# Patient Record
Sex: FEMALE | Race: BLACK OR AFRICAN AMERICAN | Employment: FULL TIME | URBAN - METROPOLITAN AREA
[De-identification: names, ages, dates, MRNs, and addresses within clinical notes are randomized per-mention and may not be internally consistent; named-entity substitution may affect disease eponyms.]

---

## 2018-08-27 ENCOUNTER — APPOINTMENT (OUTPATIENT)
Dept: PHYSICAL THERAPY | Age: 33
End: 2018-08-27
Attending: RADIOLOGY

## 2018-10-01 ENCOUNTER — HOSPITAL ENCOUNTER (OUTPATIENT)
Dept: PHYSICAL THERAPY | Age: 33
Discharge: HOME | End: 2018-10-01
Attending: RADIOLOGY | Admitting: RADIOLOGY
Payer: COMMERCIAL

## 2018-10-01 PROCEDURE — 97530 THERAPEUTIC ACTIVITIES: CPT

## 2018-10-01 PROCEDURE — 97162 PT EVAL MOD COMPLEX 30 MIN: CPT

## 2018-10-01 PROCEDURE — 97140 MANUAL THERAPY 1/> REGIONS: CPT

## 2018-10-01 NOTE — PROGRESS NOTES
PF Daily Treatment Note  Patient Name: Lisa Pozo  Date:10/1/2018  []  Patient  Verified  Insurance:Payor: BLUE CROSS MEDICAID / Plan: [employer] / Product Type: Managed Care Medicaid /   In time: 10:05  Out time:10:40  Total Treatment Time (min): 45  Total Timed Codes (min): 25  1:1 Treatment Time (MC only): 39   Visit #: 1 of 16    Treatment Area: [x] Pelvic Floor     [] Other:  SUBJECTIVE  Pain Level (0-10 scale): 1-2/10  Any medication changes, allergies to medications, adverse drug reactions, diagnosis change, or new procedure performed?: [x] No    [] Yes (see summary sheet for update)    Ms. Sandra Hylton is a 25 y/o F who presents with c/o PFD, abdominal and back pain, 2nd to endometriosis. Pt reports 2 LAP, fulguration of endometriosis, last one was on 2018. Pt reports 1 natural child birth with no significant complications recalled. Pt reported persistent and worsening pain after changing contraceptive med as she's trying to have another baby. Pt also reports mild leakage symptoms and pain with holding against strong urge. Voiding interval is about 3 hours. Nocturia 1-2/night. Pt denies bowel incontinence or straining to have successful bowel movements. Pt reports pain which occurs with mod pain with sexual relations; occasionally when she just starts to feel aroused; pt also reports using pain med before any activities. Pain is rated 2-3/3 on Marinoff scale. Pelvic Floor Dysfunction Evaluation    Musculoskeletal Screen:    Skin Integrity:  [] Healthy [] Red  [] Labia Atrophy [] Fragile    Sensation: [] Intact [] Diminished:    Muscle Bulk: [] Symmetrical  [] Well-developed [] Atrophied:  []L   []R   []B    Prolapse: [] Cystocele:   [] Rectocele:    PERF Score (Performance/Endurance/Repetitions/Flicks)   P: E: R: F: Total:    Patient has failed previous pelvic floor muscle training?   [] Yes    [] No    EMG Evaluation:  [] N/A [] Deferred secondary to:    Channel A: Electrode type:  [] Internal    [] Surface    [] Vaginal    [] Rectal  Channel B: Electrode location:    Baseline Resting Tone (1 minute)  Channel A (microvolts): Quality:  [] Normal [] Irradic [] Elevated  Channel B (microvolts): Slow Twitch: (10 second hold, 20 second rest)  Channel A (microvolts): Quality:[] Quick/slow rise [] Low net rise  Net rise (microvolts):   [] Slow Relaxation [] Incoordination       [] Unable to contract [] Fatigues at (sec):       [] Elevated baseline between contractions    Channel B (microvolts): Use of Accessory Muscles: [] Minimal  Net rise (microvolts):   [] Moderate  [] Excessive       [] No use of accessory muscles    Fast Twitch (3 second hold, 10 second rest)  Channel A (microvolts): Quality:[] Quick/slow rise [] Low net rise  Net rise (microvolts):   [] Slow Relaxation [] Incoordination       [] Unable to contract [] Fatigues at (sec):       [] Elevated baseline between contractions    Channel B (microvolts):  Use of Accessory Muscles: [] Minimal  Net rise (microvolts):   [] Moderate  [] Excessive       [] No use of accessory muscles    Optional Tests:  Lower abdominal strength: /5    Comments/Additional Tests:    WFL with trunk AROM  Hypomobility of Left SI  Right upslip and ant rotation    OBJECTIVE    20 min Evaluation    17 min Therapeutic Activity:  []  See flow sheet :Pt edu within scope of practice on prognosis, POC, PFM anatomy/physiology, PFM PT, self MET for pelvic obliquity correction, TENS unit use   Rationale: increase ROM, increase strength, improve coordination and increase proprioception  to improve the patients ability to tolerate ADLs     8 min Manual Therapy:  MET to correct Right upslip and ant rot, followed by shotgun   Rationale: decrease pain, increase ROM, increase tissue extensibility, decrease trigger points and increase postural awareness to improve tolerance for ADLs           min Patient Education: [x] Review HEP    [] Progressed/Changed HEP based on:   [] positioning   [] body mechanics   [] transfers   [] heat/ice application        Other Objective/Functional Measures:   []baseline resting tone:   []slow twitch mms   []fast twitch mms    Pain Level (0-10 scale) post treatment: 0-1/10    ASSESSMENT/Changes in Function: see POC please    []  Decrease # of leaks   [] No change []  Improving [] Resolved     []  Decrease hypertonus [] No change []  Improving [] Resolved     []  Increase void interval [] No change []  Improving [] Resolved     []  Increase PF strength [] No change []  Improving [] Resolved     []  Increase PF endurance [] No change []  Improving [] Resolved     []  Increase endurance [] No change []  Improving [] Resolved     []  Decrease # of pads [] No change []  Improving [] Resolved     []  Decrease pain [] No change []  Improving [] Resolved       Patient will continue to benefit from skilled PT services to modify and progress therapeutic interventions, address functional mobility deficits, address ROM deficits, address strength deficits, analyze and address soft tissue restrictions, analyze and cue movement patterns, analyze and modify body mechanics/ergonomics, assess and modify postural abnormalities and instruct in home and community integration to attain remaining goals.      [x]  See Plan of Care         PLAN  [x]  Upgrade activities as tolerated     [x]  Continue plan of care  [x]  Update interventions per flow sheet       []  Discharge due to:_  []  Other:_      Lusi Wu, PT 10/1/2018  10:09 AM

## 2018-10-01 NOTE — PROGRESS NOTES
[facility]  [address] [place] [address]  [phone] fax    Plan of Care/ Statement of Necessity for Physical Therapy Services    Patient name: Shalini Chapa Start of Care: 10/1/2018   Referral source: Sasha Noe* : 1985    Medical Diagnosis: Noninflammatory disorder of vagina, unspecified [N89.9]  Dysuria [R30.0]   Onset Date: chronic, worsening about 4 years ago    Treatment Diagnosis: PFD, Endometriosis   Prior Hospitalization: see medical history Provider#: [id number]   Medications: Verified on Patient summary List    Comorbidities:  Endometriosis, 2 LAP fulguration of endometriosis   Prior Level of Function: less pain, Ind with all mobility           The Plan of Care and following information is based on the information from the initial evaluation. Assessment/ espinal information: Ms. Silvio Joy is a 25 y/o F who presents with c/o PFD, abdominal and back pain, 2nd to endometriosis. Pt reports 2 LAP, fulguration of endometriosis, last one was on 2018. Pt reports 1 natural child birth with no significant complications recalled. Pt reported persistent and worsening pain after changing contraceptive med as she's trying to have another baby. Pt also reports mild leakage symptoms and pain with holding against strong urge. Voiding interval is about 3 hours. Nocturia 1-2/night. Pt denies bowel incontinence or straining to have successful bowel movements. Pt reports pain which occurs with mod pain with sexual relations; occasionally when she just starts to feel aroused; pt also reports using pain med before any activities. Pain is rated 2-3/3 on Marinoff scale.  Evaluation reveals patient with musculoskeletal screen hypomobility of Left SI joint, Right upslip and ant rotation.  Internal assessment held until next visit due to time constraint.  Patient may benefit from physical therapy to address PFD, LBP and UI symptoms to improve her QOL. Evaluation Complexity History MEDIUM  Complexity : 1-2 comorbidities / personal factors will impact the outcome/ POC ; Examination MEDIUM Complexity : 3 Standardized tests and measures addressing body structure, function, activity limitation and / or participation in recreation  ;Presentation MEDIUM Complexity : Evolving with changing characteristics  ; Clinical Decision Making MEDIUM Complexity : FOTO score of 26-74  Overall Complexity Rating: MEDIUM    Problem List: Pelvic pain/dysfunction, Decreased pelvic floor mm awareness, Decreased pelvic floor mm strength, Use of accessory muscles, Improper voiding habits and Hypertonus of pelvic floor    Treatment Plan may include any combination of the following:   Therapeutic exercise, Neuromuscular re-education, Manual therapy, Physical agent/modality and Patient education     Patient / Family readiness to learn indicated by: asking questions, trying to perform skills and interest    Persons(s) to be included in education: patient (P)    Barriers to Learning/Limitations: None    Patient Goal (s): reduce pain, learn exercises    Patient Self Reported Health Status: good    Rehabilitation Potential: good    Short Term Goals: To be accomplished in 4  weeks:  1. Patient will demonstrate home exercise program accurately as adjunct to PT clinic visits to promote healthy lifestyle and increase quality of life. Status @ Eval: initiate at 2nd viist    2. Patient will report ability to utilize Dilator progressively with no pain to promote decrease of symptoms and increase quality of life. Status @ Eval: provide as indicated      3. Patient will report pain decreased to 1-2/3 on Marinoff scale for more normal function and increased quality of life. Status @ Eval: 2-3/3      Long Term Goals: To be accomplished in 8  weeks:  1.  Patient will have FOTO score for PFDI Pain decreased by 5-8% points indicating improvement in function and report of no difficulty with maintaining intimate relations due to pain. Status @ Eval: 38    2. Patient will be able to participate in sexual relations with no discomfort with 0-1/3 Marinoff score for increased quality of life and more normal function. Status @ Eval: 2-3/3     3. Patient will have decreased overall pain to 2-3/10 at most with ADLs, job duties and working out to improve her QOL. Status @ Eval: 8/10 at worst    4. Patient will report at least 75% improvement with leakage to improve her QOL. Status @ Eval: mild symptoms, mostly during strong urge    5. Patient will be independent with HEP at time of discharge to be able to continue with pelvic floor program.  Status @ Eval: initiate at 2nd visit     Frequency / Duration: Patient to be seen 1-2 times per week for 8 weeks. Patient/ Caregiver education and instruction: Diagnosis, prognosis, Proper Voiding Habits, Diet, Pain Management, Exercises and Bladder Retraining      Jenny Singletary, PT 10/1/2018 11:11 AM    ________________________________________________________________________    I certify that the above Therapy Services are being furnished while the patient is under my care. I agree with the treatment plan and certify that this therapy is necessary.     Physician's Signature:____________Date:_________TIME:________    ** Signature, Date and Time must be completed for valid certification **      Please sign and return to [facility]   [address] [place] [address]  [phone] fax

## 2018-10-08 ENCOUNTER — HOSPITAL ENCOUNTER (OUTPATIENT)
Dept: PHYSICAL THERAPY | Age: 33
Discharge: HOME | End: 2018-10-08
Attending: RADIOLOGY | Admitting: RADIOLOGY
Payer: COMMERCIAL

## 2018-10-08 PROCEDURE — 97530 THERAPEUTIC ACTIVITIES: CPT

## 2018-10-08 PROCEDURE — 97140 MANUAL THERAPY 1/> REGIONS: CPT

## 2018-10-08 NOTE — PROGRESS NOTES
PF DAILY TREATMENT NOTE 3-16    Patient Name: Karin Venegas  Date:10/8/2018  : 1985  [x]  Patient  Verified  Payor: BLUE CROSS / Plan: [facility] PPO / Product Type: PPO /    In time: 4:40  Out time: 5:15  Total Treatment Time (min): 35  Visit #: 2 of 16    Medicare/BCBS Only   Total Timed Codes (min):  35 1:1 Treatment Time:  35     Treatment Area: [x] Pelvic Floor     [] Other:    SUBJECTIVE  Pain Level (0-10 scale): 0/10  Any medication changes, allergies to medications, adverse drug reactions, diagnosis change, or new procedure performed?: [x] No    [] Yes (see summary sheet for update)  Subjective functional status/changes:   [] No changes reported  Pt reports taking pain med as she had significant pain about 2 ago     OBJECTIVE      27 min Therapeutic Activity:  []  See flow sheet :    []  Increase Tissue extensibility        []  Assess fiber intake    []  Assess voiding habits  []  Assess bowel habits  []  Other:   Rationale: increase ROM, increase strength, improve coordination and increase proprioception  to improve the patients ability to improve tolerance for ADLs      8 min Manual Therapy:  Intravaginal MFR   Rationale: decrease pain, increase ROM and decrease trigger points to improve pt's tolerance for ADLs/amb    With   [] TE   [] TA   [] neuro  [] manual   [] other: Patient Education: [x] Review HEP    [] Progressed/Changed HEP based on:   [] positioning   [] body mechanics   [] transfers   [] heat/ice application    [] other:      Other Objective/Functional Measures:   []baseline resting tone:   []slow twitch mms   []fast twitch mms   Pt was 10 min late  Skin Integrity:            [x] Healthy                     [] Red                                     [] Labia Atrophy           [] Fragile     Sensation:                 [x] Intact            [] Diminished:     Muscle Bulk:              [] Symmetrical  [] Well-developed                     [] Atrophied:  []L   []R []B     Prolapse: no instability notice                   [] Cystocele:                                                            [] Rectocele:     PERF Score (Performance/Endurance/Repetitions/Flicks)                        P: 3                   E: 5                   R: 5                  F:                   Total:     Patient has failed previous pelvic floor muscle training? [] Yes    [x] No    Pain Level (0-10 scale) post treatment: 1/10    ASSESSMENT/Changes in Function: pt reported some improvement with self correction therex but then she has flare up again. Pt demonstrates mod hypertonicity with deep hips muscles and B levator ani. However, she also demonstrates fairly good strength and endurance of PFM, good awareness and understanding of downtraining therex. Will progress with MFR for abdominal, stretchings and TENS unit use next visit.     []  Decrease # of leaks   [] No change []  Improving [] Resolved     []  Decrease hypertonus [] No change []  Improving [] Resolved     []  Increase void interval [] No change []  Improving [] Resolved     []  Increase PF strength [] No change []  Improving [] Resolved     []  Increase PF endurance [] No change []  Improving [] Resolved     []  Increase endurance [] No change []  Improving [] Resolved     []  Decrease # of pads [] No change []  Improving [] Resolved     []  Decrease pain [] No change []  Improving [] Resolved     []  Increased coordination [] No change []  Improving [] Resolved     []  Increased Bowel Frequency [] No change []  Improving [] Resolved       Patient will continue to benefit from skilled PT services to modify and progress therapeutic interventions, address functional mobility deficits, address ROM deficits, address strength deficits, analyze and address soft tissue restrictions, analyze and cue movement patterns, analyze and modify body mechanics/ergonomics, assess and modify postural abnormalities and instruct in home and community integration to attain remaining goals. [x]  See Plan of Care  []  See progress note/recertification  []  See Discharge Summary         Progress towards goals / Updated goals:  Short Term Goals: To be accomplished in 4  weeks:  1. Patient will demonstrate home exercise program accurately as adjunct to PT clinic visits to promote healthy lifestyle and increase quality of life. Status @ Eval: initiate at 2nd viist   Current: mod flare up with self correction therex for pelvic alignment, will reassess next visit 10-8-18     2. Patient will report ability to utilize Dilator progressively with no pain to promote decrease of symptoms and increase quality of life. Status @ Eval: provide as indicated       3. Patient will report pain decreased to 1-2/3 on Marinoff scale for more normal function and increased quality of life. Status @ Eval: 2-3/3       Long Term Goals: To be accomplished in 8  weeks:  1. Patient will have FOTO score for PFDI Pain decreased by 5-8% points indicating improvement in function and report of no difficulty with maintaining intimate relations due to pain. Status @ Eval: 38     2. Patient will be able to participate in sexual relations with no discomfort with 0-1/3 Marinoff score for increased quality of life and more normal function. Status @ Eval: 2-3/3      3. Patient will have decreased overall pain to 2-3/10 at most with ADLs, job duties and working out to improve her QOL. Status @ Eval: 8/10 at worst     4. Patient will report at least 75% improvement with leakage to improve her QOL. Status @ Eval: mild symptoms, mostly during strong urge   Current: no leakage since last visit 10-8-18     5.  Patient will be independent with HEP at time of discharge to be able to continue with pelvic floor program.  Status @ Eval: initiate at 2nd visit     PLAN  [x]  Upgrade activities as tolerated     [x]  Continue plan of care  [x]  Update interventions per flow sheet       []  Discharge due to:_  []  Other:_      Audrey Ramp, PT 10/8/2018  7:54 AM    Future Appointments  Date Time Provider Laurent Ceci   10/8/2018 4:30 PM Thienphuc Lacinda Drought [id number] [facility]   10/15/2018 3:15 PM Thienphuc Bryceinda Drought RSROPCC [facility]   10/22/2018 3:45 PM Thijosephuc JERROD Le MMCPTPB [facility]   10/29/2018 2:00 PM Thipako Long [id number] [facility]   11/5/2018 2:00 PM Thijosephlinsey Ahujajesse Mercedes KGTOVQS [facility]   11/12/2018 2:00 PM Thipako Long GDGGSHT [facility]   11/19/2018 3:15 PM Thijosephlinsey Ahujajesse Merceeds [id number] [facility]   11/26/2018 2:00 PM Kirk Meier Le MMCPTPB [facility]

## 2018-10-15 ENCOUNTER — HOSPITAL ENCOUNTER (OUTPATIENT)
Dept: PHYSICAL THERAPY | Age: 33
Discharge: HOME | End: 2018-10-15
Attending: RADIOLOGY | Admitting: RADIOLOGY
Payer: COMMERCIAL

## 2018-10-15 PROCEDURE — 97110 THERAPEUTIC EXERCISES: CPT

## 2018-10-15 PROCEDURE — 97140 MANUAL THERAPY 1/> REGIONS: CPT

## 2018-10-15 PROCEDURE — 97530 THERAPEUTIC ACTIVITIES: CPT

## 2018-10-15 NOTE — PROGRESS NOTES
PF DAILY TREATMENT NOTE 3-16    Patient Name: Lisa Pozo  Date:10/15/2018  : 1985  [x]  Patient  Verified  Payor: BLUE CROSS / Plan: [facility] PPO / Product Type: PPO /    In time: 3:21  Out time:4:25  Total Treatment Time (min): 64  Visit #: 3 of 16    Medicare/BCBS Only   Total Timed Codes (min):  64 1:1 Treatment Time:  64     Treatment Area: [x] Pelvic Floor     [] Other:    SUBJECTIVE  Pain Level (0-10 scale): 4-5/10  Any medication changes, allergies to medications, adverse drug reactions, diagnosis change, or new procedure performed?: [x] No    [] Yes (see summary sheet for update)  Subjective functional status/changes:   [] No changes reported  Pt reported having some pain after PFM down training therex but she forgot to decreased duration/frequency as instructed. Pt reports holding her baby on 1 side for 2 years.     OBJECTIVE    Modality rationale: decrease pain, increase tissue extensibility and increase muscle contraction/control to improve the patients ability to tolerate ADLs   Min Type Additional Details   10 during manual [x] Estim:  []Unatt       []IFC  []Premod                        [x]Other: Burst/HV  []w/ice   [x]w/heat  Position: long sitting  Location: Right QL    [] Estim: []Att    []TENS instruct  []NMES                    []Other:  []w/US   []w/ice   []w/heat  Position:  Location:    []  Ultrasound: []Continuous   [] Pulsed                           []1MHz   []3MHz Position:  Location:    []  Ice     []  heat  []  Ice massage  []  Laser   []  Anodyne Position:  Location:   [] Skin assessment post-treatment:  []intact []redness- no adverse reaction    []redness - adverse reaction:         10 min Therapeutic Exercise:  [x] See flow sheet :   []  Pelvic floor strengthening                 []  Pelvic floor downtraining  []  Quality pelvic floor contractions       []  Relaxation techniques  []  Urge suppression exercises  []  Other:  Rationale: increase ROM, increase strength, improve coordination, improve balance and increase proprioception  to improve the patients ability to tolerate ADLs     15 min Therapeutic Activity:  []  See flow sheet :    []  Increase Tissue extensibility        []  Assess fiber intake    [x]  Assess voiding habits                                          []  Assess bowel habits  [x]  Other:  Modification of PFM therex, education on TENS unit use                       Rationale: increase ROM, increase strength, improve coordination and increase proprioception  to improve the patients ability to improve tolerance for ADLs     39 min Manual Therapy:  MET/myofascial pull to correct Right Up slip, AI or Left PI, followed by shot gun, DTM/TPR for B QLs, focussed on Right side, abdominal MFR   Rationale: decrease pain, increase ROM, increase tissue extensibility and decrease trigger points to improve pt's tolerance for ADLs     min Bladder Training :   [] voiding schedule          [] program progression  [] decrease every  minutes/hours           With   [x] TE   [] TA   [] neuro  [] manual   [] other: Patient Education: [x] Review HEP    [] Progressed/Changed HEP based on:   [] positioning   [] body mechanics   [] transfers   [] heat/ice application    [] other:      Other Objective/Functional Measures:   []baseline resting tone:   []slow twitch mms   []fast twitch mms    Pain Level (0-10 scale) post treatment: 0-1/10    ASSESSMENT/Changes in Function: pt demonstrated good motivation/compliance with all education/HEP. Pt cont to present with mal-alignment of pelvic; pt recalls that she's been carrying her son on Right side only. Will cont with pelvic alignment assessment and internal manual next visit.      []  Decrease # of leaks [] No change []  Improving [] Resolved   []  Decrease hypertonus [] No change []  Improving [] Resolved   []  Increase void interval [] No change []  Improving [] Resolved   []  Increase PF strength [] No change [] Improving [] Resolved   []  Increase PF endurance [] No change []  Improving [] Resolved   []  Increase endurance [] No change []  Improving [] Resolved   []  Decrease # of pads [] No change []  Improving [] Resolved   []  Decrease pain [] No change []  Improving [] Resolved   []  Increased coordination [] No change []  Improving [] Resolved   []  Increased Bowel Frequency [] No change []  Improving [] Resolved      Patient will continue to benefit from skilled PT services to modify and progress therapeutic interventions, address functional mobility deficits, address ROM deficits, address strength deficits, analyze and address soft tissue restrictions, analyze and cue movement patterns, analyze and modify body mechanics/ergonomics, assess and modify postural abnormalities and instruct in home and community integration to attain remaining goals.      [x]  See Plan of Care  []  See progress note/recertification  []  See Discharge Summary      Progress towards goals / Updated goals:  Short Term Goals: To be accomplished in 4  weeks:  1. Patient will demonstrate home exercise program accurately as adjunct to PT clinic visits to promote healthy lifestyle and increase quality of life. Status @ Eval: initiate at 2nd viist   Current: mod flare up with self correction therex for pelvic alignment, will reassess next visit 10-8-18      2. Patient will report ability to utilize Dilator progressively with no pain to promote decrease of symptoms and increase quality of life. Status @ Eval: provide as indicated        3. Patient will report pain decreased to 1-2/3 on Marinoff scale for more normal function and increased quality of life. Status @ Eval: 2-3/3       Long Term Goals: To be accomplished in 8  weeks:  1. Patient will have FOTO score for PFDI Pain decreased by 5-8% points indicating improvement in function and report of no difficulty with maintaining intimate relations due to pain. Status @ Eval: 38      2.  Patient will be able to participate in sexual relations with no discomfort with 0-1/3 Marinoff score for increased quality of life and more normal function. Status @ Eval: 2-3/3       3. Patient will have decreased overall pain to 2-3/10 at most with ADLs, job duties and working out to improve her QOL. Status @ Eval: 8/10 at worst      4. Patient will report at least 75% improvement with leakage to improve her QOL.   Status @ Eval: mild symptoms, mostly during strong urge   Current: no leakage since last visit 10-8-18      5. Patient will be independent with HEP at time of discharge to be able to continue with pelvic floor program.  Status @ Eval: initiate at 2nd visit      PLAN  [x]  Upgrade activities as tolerated     [x]  Continue plan of care  [x]  Update interventions per flow sheet       []  Discharge due to:_  []  Other:_      Jenny Counter, PT 10/15/2018  7:57 AM    Future Appointments  Date Time Provider Laurent Ornelas   10/15/2018 3:15 PM Thienphuc Yvan Crissy MMCPTPB [facility]   10/22/2018 3:45 PM Jenny Counter MMCPTPB [facility]   10/29/2018 2:00 PM Thienphuc JERROD Dotson Reddish [id number] [facility]   11/5/2018 2:00 PM Thienphuc Yvan Crissy OCADNSI [facility]   11/12/2018 2:00 PM Thienphuc Yvan Crissy WMHJLCE [facility]   11/19/2018 3:15 PM Thienphuc JERROD Dotson Reddish [id number] [facility]   11/26/2018 2:00 PM Adebayo Robison Yvan Crissy MMCPTPB [facility]

## 2018-10-22 ENCOUNTER — HOSPITAL ENCOUNTER (OUTPATIENT)
Dept: PHYSICAL THERAPY | Age: 33
Discharge: HOME | End: 2018-10-22
Attending: RADIOLOGY | Admitting: RADIOLOGY
Payer: COMMERCIAL

## 2018-10-22 PROCEDURE — 97140 MANUAL THERAPY 1/> REGIONS: CPT

## 2018-10-22 PROCEDURE — 97530 THERAPEUTIC ACTIVITIES: CPT

## 2018-10-22 NOTE — PROGRESS NOTES
PF DAILY TREATMENT NOTE 3-16    Patient Name: Shalini Chapa  Date:10/22/2018  : 1985  [x]  Patient  Verified  Payor: BLUE CROSS / Plan: [facility] [id number] PPO / Product Type: PPO /    In time: 3:53  Out time: 4:35  Total Treatment Time (min): 42  Visit #: 4 of 16    Medicare/BCBS Only   Total Timed Codes (min):  42 1:1 Treatment Time:  42     Treatment Area: [x] Pelvic Floor     [] Other:    SUBJECTIVE  Pain Level (0-10 scale): 0/10  Any medication changes, allergies to medications, adverse drug reactions, diagnosis change, or new procedure performed?: [x] No    [] Yes (see summary sheet for update)  Subjective functional status/changes:   [] No changes reported  Pt reported having more pain in the morning, must take med    OBJECTIVE    10 min Therapeutic Activity:  []  See flow sheet :    []  Increase Tissue extensibility        []  Assess fiber intake    [x]  Assess voiding habits                                          []  Assess bowel habits  [x]  Other: reviewed correction HEP and stretching sequence                      Rationale: increase ROM, increase strength, improve coordination and increase proprioception  to improve the patients ability to improve tolerance for ADLs     32 min Manual Therapy:  MET/myofascial pull to correct Right Up slip, AI or Left PI, followed by shot gun, intra-vagina MFR   Rationale: decrease pain, increase ROM, increase tissue extensibility and decrease trigger points to improve pt's tolerance for ADLs          With   [] TE   [] TA   [] neuro  [] manual   [] other: Patient Education: [x] Review HEP    [] Progressed/Changed HEP based on:   [] positioning   [] body mechanics   [] transfers   [] heat/ice application    [] other:      Other Objective/Functional Measures:   []baseline resting tone:   []slow twitch mms   []fast twitch mms   Right upslip, Right AI vs.Left PI    Pain Level (0-10 scale) post treatment: 0/10    ASSESSMENT/Changes in Function: pt cont to present with mal-alignment of pelvic and hypertonicity with PFM. Updated self correction therex. Will cont with internal manual and progress with core and hips strengthening next visit to maintain alignment next visit.        []  Decrease # of leaks [] No change []  Improving [] Resolved   []  Decrease hypertonus [] No change []  Improving [] Resolved   []  Increase void interval [] No change []  Improving [] Resolved   []  Increase PF strength [] No change []  Improving [] Resolved   []  Increase PF endurance [] No change []  Improving [] Resolved   []  Increase endurance [] No change []  Improving [] Resolved   []  Decrease # of pads [] No change []  Improving [] Resolved   []  Decrease pain [] No change []  Improving [] Resolved   []  Increased coordination [] No change []  Improving [] Resolved   []  Increased Bowel Frequency [] No change []  Improving [] Resolved      Patient will continue to benefit from skilled PT services to modify and progress therapeutic interventions, address functional mobility deficits, address ROM deficits, address strength deficits, analyze and address soft tissue restrictions, analyze and cue movement patterns, analyze and modify body mechanics/ergonomics, assess and modify postural abnormalities and instruct in home and community integration to attain remaining goals.      [x]  See Plan of Care  []  See progress note/recertification  []  See Discharge Summary      Progress towards goals / Updated goals:  Short Term Goals: To be accomplished in 4  weeks:  1. Patient will demonstrate home exercise program accurately as adjunct to PT clinic visits to promote healthy lifestyle and increase quality of life. Status @ Eval: initiate at 2nd viist   Current: mod flare up with self correction therex for pelvic alignment, will reassess next visit 10-8-18      2.  Patient will report ability to utilize Dilator progressively with no pain to promote decrease of symptoms and increase quality of life. Status @ Eval: provide as indicated        3. Patient will report pain decreased to 1-2/3 on Marinoff scale for more normal function and increased quality of life. Status @ Eval: 2-3/3   Current: fluctuating with pain during sexual relations, significant pain on Thursday last week when no pain with last time (Saturday)10-22-18      Long Term Goals: To be accomplished in 8  weeks:  1. Patient will have FOTO score for PFDI Pain decreased by 5-8% points indicating improvement in function and report of no difficulty with maintaining intimate relations due to pain. Status @ Eval: 38      2. Patient will be able to participate in sexual relations with no discomfort with 0-1/3 Marinoff score for increased quality of life and more normal function. Status @ Eval: 2-3/3   Current: fluctuating with pain during sexual relations, significant pain on Thursday last week when no pain with last time (Saturday) 10-22-18      3. Patient will have decreased overall pain to 2-3/10 at most with ADLs, job duties and working out to improve her QOL. Status @ Eval: 8/10 at worst      4. Patient will report at least 75% improvement with leakage to improve her QOL.   Status @ Eval: mild symptoms, mostly during strong urge   Current: no leakage since last visit 10-8-18, no leakage 10-22-18      5. Patient will be independent with HEP at time of discharge to be able to continue with pelvic floor program.  Status @ Eval: initiate at 2nd visit      PLAN  [x]  Upgrade activities as tolerated     [x]  Continue plan of care  [x]  Update interventions per flow sheet       []  Discharge due to:_  []  Other:_      Carlos Ross, ASHTYN 10/22/2018  7:56 AM    Future Appointments   Date Time Provider Laurent Ornelas   10/22/2018  3:45 PM Addison Masker VTLFFHW [facility]   10/29/2018  2:00 PM Addison Masker MMCPTPB [facility]   11/5/2018  2:00 PM Addison Masker MMCPTPB [facility]   11/12/2018  2:00 PM Addison Masker MMCPTPB [facility]   11/19/2018  3:15 PM Thao Ramires [id number] [facility]   11/26/2018  2:00 PM Thao Ramires MMCPTPB [facility]

## 2018-10-29 ENCOUNTER — HOSPITAL ENCOUNTER (OUTPATIENT)
Dept: PHYSICAL THERAPY | Age: 33
Discharge: HOME | End: 2018-10-29
Attending: RADIOLOGY | Admitting: RADIOLOGY
Payer: COMMERCIAL

## 2018-10-29 PROCEDURE — 97110 THERAPEUTIC EXERCISES: CPT

## 2018-10-29 PROCEDURE — 97140 MANUAL THERAPY 1/> REGIONS: CPT

## 2018-10-29 NOTE — PROGRESS NOTES
PF DAILY TREATMENT NOTE 3-16    Patient Name: Tim Gama  Date:10/29/2018  : 1985  [x]  Patient  Verified  Payor: BLUE CROSS / Plan: [facility] PPO / Product Type: PPO /    In time: 2:02  Out time:2:50  Total Treatment Time (min): 48  Visit #: 5 of 16    Medicare/BCBS Only   Total Timed Codes (min):  48 1:1 Treatment Time:  48     Treatment Area: [x] Pelvic Floor     [] Other:    SUBJECTIVE  Pain Level (0-10 scale): 1-2/10  Any medication changes, allergies to medications, adverse drug reactions, diagnosis change, or new procedure performed?: [x] No    [] Yes (see summary sheet for update)  Subjective functional status/changes:   [] No changes reported  \"I'm doing better gradually. \"    OBJECTIVE    Modality rationale: decrease pain and increase tissue extensibility to improve the patients ability to tolerate ADLs   Type Additional Details   [x] Estim:  []Unatt       []IFC  [x]Premod  (High voltage) 12 min during manual                   []Other:  []w/ice   [x]w/heat  Position: supine  Location: Right t/s paraspinal    [] Estim: []Att    []TENS instruct  []NMES                    []Other:  []w/US   []w/ice   []w/heat  Position:  Location:   []  Ultrasound: []Continuous   [] Pulsed                           []1MHz   []3MHz Position:  Location:   []  Ice     []  heat  []  Ice massage  []  Laser   []  Anodyne Position:  Location:   [] Skin assessment post-treatment:  []intact []redness- no adverse reaction    []redness - adverse reaction:         8 min Therapeutic Exercise:  [] See flow sheet :   []  Pelvic floor strengthening                 []  Pelvic floor downtraining  []  Quality pelvic floor contractions       []  Relaxation techniques  []  Urge suppression exercises  []  Other:  Rationale: increase ROM, increase strength and improve coordination  to improve the patients ability to perform ADLs with ease        8 min Therapeutic Activity:  []  See flow sheet :    []  Increase Tissue extensibility        []  Assess fiber intake    [x]  Assess voiding habits                                          []  Assess bowel habits  [x]  Other: reviewed correction HEP and stretching sequence                      Rationale: increase ROM, increase strength, improve coordination and increase proprioception  to improve the patients ability to improve tolerance for ADLs     32 min Manual Therapy:  MET/myofascial pull;  intra-vagina MFR   Rationale: decrease pain, increase ROM, increase tissue extensibility and decrease trigger points to improve pt's tolerance for ADLs          With   [] TE   [] TA   [] neuro  [] manual   [] other: Patient Education: [x] Review HEP    [] Progressed/Changed HEP based on:   [] positioning   [] body mechanics   [] transfers   [] heat/ice application    [] other:      Other Objective/Functional Measures:   []baseline resting tone:   []slow twitch mms   []fast twitch mms   Improving tone with PFM   FOTO: 29    Pain Level (0-10 scale) post treatment: 1/10    ASSESSMENT/Changes in Function: See progress note/recertification    []  Decrease # of leaks [] No change []  Improving [] Resolved   []  Decrease hypertonus [] No change []  Improving [] Resolved   []  Increase void interval [] No change []  Improving [] Resolved   []  Increase PF strength [] No change []  Improving [] Resolved   []  Increase PF endurance [] No change []  Improving [] Resolved   []  Increase endurance [] No change []  Improving [] Resolved   []  Decrease # of pads [] No change []  Improving [] Resolved   []  Decrease pain [] No change []  Improving [] Resolved   []  Increased coordination [] No change []  Improving [] Resolved   []  Increased Bowel Frequency [] No change []  Improving [] Resolved      Patient will continue to benefit from skilled PT services to modify and progress therapeutic interventions, address functional mobility deficits, address ROM deficits, address strength deficits, analyze and address soft tissue restrictions, analyze and cue movement patterns, analyze and modify body mechanics/ergonomics, assess and modify postural abnormalities and instruct in home and community integration to attain remaining goals.      []  See Plan of Care  [x]  See progress note/recertification  []  See Discharge Summary      Progress towards goals / Updated goals:  Short Term Goals: To be accomplished in 4  weeks:  1. Patient will demonstrate home exercise program accurately as adjunct to PT clinic visits to promote healthy lifestyle and increase quality of life. Status @ Eval: initiate at 2nd viist   Current: mod flare up with self correction therex for pelvic alignment, will reassess next visit 10-8-18      2. Patient will report ability to utilize Dilator progressively with no pain to promote decrease of symptoms and increase quality of life. Status @ Eval: provide as indicated   Current: will initiate when appropriate 10-29-18      3. Patient will report pain decreased to 1-2/3 on Marinoff scale for more normal function and increased quality of life. Status @ Eval: 2-3/3   Current: fluctuating with pain during sexual relations, significant pain on Thursday last week when no pain with last time (Saturday)10-22-18      Long Term Goals: To be accomplished in 8  weeks:  1. Patient will have FOTO score for PFDI Pain decreased by 5-8% points indicating improvement in function and report of no difficulty with maintaining intimate relations due to pain. Status @ Eval: 38  Current: progressing well 29 10-29-18      2. Patient will be able to participate in sexual relations with no discomfort with 0-1/3 Marinoff score for increased quality of life and more normal function. Status @ Eval: 2-3/3   Current: fluctuating with pain during sexual relations, significant pain on Thursday last week when no pain with last time (Saturday) 10-22-18      3.  Patient will have decreased overall pain to 2-3/10 at most with ADLs, job duties and working out to improve her QOL. Status @ Eval: 8/10 at worst  Current: 6/10 10-29-18      4. Patient will report at least 75% improvement with leakage to improve her QOL.   Status @ Eval: mild symptoms, mostly during strong urge   Current: no leakage since last visit 10-8-18, no leakage 10-22-18      5. Patient will be independent with HEP at time of discharge to be able to continue with pelvic floor program.  Status @ Eval: initiate at 2nd visit  Current: good compliance 10-29-18      PLAN  [x]  Upgrade activities as tolerated     [x]  Continue plan of care  [x]  Update interventions per flow sheet       []  Discharge due to:_  []  Other:_      Celester Reusing, PT 10/29/2018  10:06 AM    Future Appointments   Date Time Provider Laurent Ornelas   10/29/2018  2:00 PM Azael Null [id number] [facility]   11/5/2018  2:00 PM Azael Null MMCPTPB [facility]   11/12/2018  2:00 PM Azael Null MMCPTPB [facility]   11/19/2018  3:15 PM Coby ELDER DMPYYHC [facility]   11/26/2018  2:00 PM Azael Null MMCPTPB [facility]

## 2018-10-29 NOTE — PROGRESS NOTES
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]  Dysuria [R30.0]    Onset Date: chronic, worsening about 4 years ago               Treatment Diagnosis: PFD, Endometriosis   Prior Hospitalization: see medical history Provider#: [id number]   Medications: Verified on Patient summary List    Comorbidities:  Endometriosis, 2 LAP fulguration of endometriosis   Prior Level of Function: less pain, Ind with all mobility     Visits from Start of Care: 5    Missed Visits: 0    Established Goals:           Excellent Good         Limited           None  [] Increase Pelvic MM strength []  []  []  []  [x] Decrease Pelvic MM hypertonus []  [x]  []  []  [x] Decrease Incontinence Episodes [x]  []  []  []   [x] Improve Voiding Habits  []  [x]  []  []  [] Decreased Urgency   []  []  []  []    Key Functional Changes: improving overall pain, improving incontinence symptoms    Updated Goals: to be achieved in 5 weeks:   1. Patient will have FOTO score for PFDI Pain decreased by 5-8% points indicating improvement in function and report of no difficulty with maintaining intimate relations due to pain. Status @ Eval: 38  Current: progressing well 29 10-29-18      2. Patient will be able to participate in sexual relations with no discomfort with 0-1/3 Marinoff score for increased quality of life and more normal function. Status @ Eval: 2-3/3   Current: fluctuating with pain during sexual relations, significant pain on Thursday last week when no pain with last time (Saturday) 10-22-18, 30% improvement overall 10-29-18      3.  Patient will have decreased overall pain to 2-3/10 at most with ADLs, job duties and working out to improve her QOL. Status @ Eval: 8/10 at worst  Current: 6/10 10-29-18      4. Patient will be independent with HEP at time of discharge to be able to continue with pelvic floor program.  Status @ Eval: initiate at 2nd visit  Current: good compliance 10-29-18     ASSESSMENT/RECOMMENDATIONS: Pt making good progress with mod improvement of overall pain, no leakage noticed since [facility]. PT reports at least 30% improvement. Pt cont to present with mal-alignment of pelvic, mod tightness of Right paraspinal muscle, min increased tone/tightness of PFM, worst with levator ani and B OI of the Left side. Pt also demonstrates fair body mechanics and poor habit with holding her baby, lifting and carrying heavy handbag with Right side more than Left. Pt would cont to benefit from skilled PT to address these limitations and work towards unmet goals. [x]Continue therapy per initial plan/protocol at a frequency of  1-2 x per week for 5 weeks  []Continue therapy with the following recommended changes:_____________________      _____________________________________________________________________  []Discontinue therapy progressing towards or have reached established goals  []Discontinue therapy due to lack of appreciable progress towards goals  []Discontinue therapy due to lack of attendance or compliance  []Await Physician's recommendations/decisions regarding therapy  []Other:________________________________________________________________    Thank you for this referral.   Giuliana Pink, PT 10/29/2018 5:53 PM  NOTE TO PHYSICIAN:  PLEASE COMPLETE THE ORDERS BELOW AND   FAX TO [facility]: [phone]  If you are unable to process this request in 24 hours please contact our office: [phone] I have read the above report and request that my patient continue as recommended.   ? I have read the above report and request that my patient continue therapy with the following changes/special instructions:___________________________________________________________  ? I have read the above report and request that my patient be discharged from therapy.     [de-identified] Signature:____________Date:_________TIME:________    [employer], Date and Time must be completed for valid certification **

## 2018-11-05 ENCOUNTER — HOSPITAL ENCOUNTER (OUTPATIENT)
Dept: PHYSICAL THERAPY | Age: 33
Discharge: HOME | End: 2018-11-05
Attending: RADIOLOGY | Admitting: RADIOLOGY
Payer: COMMERCIAL

## 2018-11-05 PROCEDURE — 97530 THERAPEUTIC ACTIVITIES: CPT

## 2018-11-05 PROCEDURE — 97110 THERAPEUTIC EXERCISES: CPT

## 2018-11-05 PROCEDURE — 97140 MANUAL THERAPY 1/> REGIONS: CPT

## 2018-11-05 PROCEDURE — 97014 ELECTRIC STIMULATION THERAPY: CPT

## 2018-11-05 NOTE — PROGRESS NOTES
PF DAILY TREATMENT NOTE 3-16    Patient Name: Ty Bookbinder  Date:2018  : 1985  [x]  Patient  Verified  Payor: BLUE CROSS / Plan: [facility] PPO / Product Type: PPO /    In time:2:04  Out time: 3:13  Total Treatment Time (min): 71  Visit #: 6 of 16    Medicare/BCBS Only   Total Timed Codes (min):  55 1:1 Treatment Time:  55     Treatment Area: [x] Pelvic Floor     [] Other:    SUBJECTIVE  Pain Level (0-10 scale): 2-3/10  Any medication changes, allergies to medications, adverse drug reactions, diagnosis change, or new procedure performed?: [x] No    [] Yes (see summary sheet for update)  Subjective functional status/changes:   [] No changes reported  Pt reported that she's having a bad week.     OBJECTIVE    Modality rationale: decrease pain and increase tissue extensibility to improve the patients ability to tolerate ADLs   Type Additional Details    [x] Estim:  []Unatt       []IFC  [x]Premod  (High voltage) 14 min with set up time                   []Other:  []w/ice   [x]w/heat  Position: supine  Location: lower abdomina and PFM    [] Estim: []Att    []TENS instruct  []NMES                    []Other:  []w/US   []w/ice   []w/heat  Position:  Location:    []  Ultrasound: []Continuous   [] Pulsed                           []1MHz   []3MHz Position:  Location:    []  Ice     []  heat  []  Ice massage  []  Laser   []  Anodyne Position:  Location:    [] Skin assessment post-treatment:  []intact []redness- no adverse reaction    []redness - adverse reaction:            27 min Therapeutic Exercise:  [x] See flow sheet :   []  Pelvic floor strengthening                 []  Pelvic floor downtraining  []  Quality pelvic floor contractions       [x]  Relaxation techniques  []  Urge suppression exercises  [x]  Other: self correction for hip alginment  Rationale: increase ROM, increase strength and improve coordination  to improve the patients ability to perform ADLs with ease    8 min Therapeutic Activity:  []  See flow sheet :    []  Increase Tissue extensibility        []  Assess fiber intake    [x]  Assess voiding habits                                          []  Assess bowel habits  [x]  Other: reviewed TENs unit use, modalities use, healthy bladder   Rationale: increase ROM, increase strength, improve coordination and increase proprioception  to improve the patients ability to improve tolerance for ADLs      10 min Manual Therapy: lower abdominal MFR   Rationale: decrease pain, increase ROM, increase tissue extensibility and decrease trigger points to improve pt's tolerance for ADLs          With   [] TE   [] TA   [] neuro  [] manual   [] other: Patient Education: [x] Review HEP    [] Progressed/Changed HEP based on:   [] positioning   [] body mechanics   [] transfers   [] heat/ice application    [] other:      Other Objective/Functional Measures:   []baseline resting tone:   []slow twitch mms   []fast twitch mms    Pain Level (0-10 scale) post treatment: 0/10    ASSESSMENT/Changes in Function: pt reported no significant relief of back pain with correction HEP; pleased with ESTIM for lower abdominal and PFM.  Educated pt on ESTIM use at home; will resume internal and cont abdominal MFR next visit.      []  Decrease # of leaks [] No change []  Improving [] Resolved   []  Decrease hypertonus [] No change []  Improving [] Resolved   []  Increase void interval [] No change []  Improving [] Resolved   []  Increase PF strength [] No change []  Improving [] Resolved   []  Increase PF endurance [] No change []  Improving [] Resolved   []  Increase endurance [] No change []  Improving [] Resolved   []  Decrease # of pads [] No change []  Improving [] Resolved   []  Decrease pain [] No change []  Improving [] Resolved   []  Increased coordination [] No change []  Improving [] Resolved   []  Increased Bowel Frequency [] No change []  Improving [] Resolved      Patient will continue to benefit from skilled PT services to modify and progress therapeutic interventions, address functional mobility deficits, address ROM deficits, address strength deficits, analyze and address soft tissue restrictions, analyze and cue movement patterns, analyze and modify body mechanics/ergonomics, assess and modify postural abnormalities and instruct in home and community integration to attain remaining goals.      []  See Plan of Care  [x]  See progress note/recertification  []  See Discharge Summary      Progress towards goals / Updated goals:   Updated Goals: to be achieved in 5 weeks:  1. Patient will have FOTO score for PFDI Pain decreased by 5-8% points indicating improvement in function and report of no difficulty with maintaining intimate relations due to pain. Status @ Eval: 38  Current: progressing well 29 10-29-18      2. Patient will be able to participate in sexual relations with no discomfort with 0-1/3 Marinoff score for increased quality of life and more normal function. Status @ Eval: 2-3/3   Current: fluctuating with pain during sexual relations, significant pain on Thursday last week when no pain with last time (Saturday) 10-22-18, 30% improvement overall 10-29-18      3. Patient will have decreased overall pain to 2-3/10 at most with ADLs, job duties and working out to improve her QOL. Status @ Eval: 8/10 at worst  Current: 6/10 10-29-18      4.  Patient will be independent with HEP at time of discharge to be able to continue with pelvic floor program.  Status @ Eval: initiate at 2nd visit  Current: good compliance 10-29-18     PLAN  [x]  Upgrade activities as tolerated     [x]  Continue plan of care  [x]  Update interventions per flow sheet       []  Discharge due to:_  []  Other:_      Maria Elena Dill, PT 11/5/2018  9:00 AM    Future Appointments   Date Time Provider Laurent Ornelas   11/5/2018  2:00 PM Geradine Lynette EIPLSDN [facility]   11/12/2018  2:00 PM Gerlisy Ojeda MMCPTPB [facility]   11/19/2018 3:15 PM Nikki LYNNRJ [facility]   11/26/2018  2:00 PM Nikki Melchor [facility]CPTPB [facility]

## 2018-11-12 ENCOUNTER — HOSPITAL ENCOUNTER (OUTPATIENT)
Dept: PHYSICAL THERAPY | Age: 33
Discharge: HOME | End: 2018-11-12
Attending: RADIOLOGY | Admitting: RADIOLOGY
Payer: COMMERCIAL

## 2018-11-12 PROCEDURE — 97014 ELECTRIC STIMULATION THERAPY: CPT

## 2018-11-12 PROCEDURE — 97140 MANUAL THERAPY 1/> REGIONS: CPT

## 2018-11-12 NOTE — PROGRESS NOTES
PF DAILY TREATMENT NOTE 3-16    Patient Name: Nisha Patton  Date:2018  : 1985  [x]  Patient  Verified  Payor: BLUE CROSS / Plan: [facility] PPO / Product Type: PPO /    In time: 2:01  Out time: 2:45  Total Treatment Time (min): 44  Visit #: 7 of 16    Medicare/BCBS Only   Total Timed Codes (min):  34 1:1 Treatment Time:  44     Treatment Area: [x] Pelvic Floor     [] Other:    SUBJECTIVE  Pain Level (0-10 scale): 4-5/10  Any medication changes, allergies to medications, adverse drug reactions, diagnosis change, or new procedure performed?: [x] No    [] Yes (see summary sheet for update)  Subjective functional status/changes:   [] No changes reported  Pt reported     OBJECTIVE             Modality rationale: decrease pain and increase tissue extensibility to improve the patients ability to tolerate ADLs   Type Additional Details     [x] Estim:  []Unatt       []IFC  [x]Premod  (High voltage) 10 min with set up time                   []Other:  []w/ice   [x]w/heat  Position: supine  Location: lower abdomina and PFM     [] Estim: []Att    []TENS instruct  []NMES                    []Other:  []w/US   []w/ice   []w/heat  Position:  Location:     []  Ultrasound: []Continuous   [] Pulsed                           []1MHz   []3MHz Position:  Location:     []  Ice     []  heat  []  Ice massage  []  Laser   []  Anodyne Position:  Location:     [] Skin assessment post-treatment:  []intact []redness- no adverse reaction    []redness - adverse reaction:          34 min Manual Therapy: lower abdominal MFR and intra-vaginal MFR   Rationale: decrease pain, increase ROM, increase tissue extensibility and decrease trigger points to improve pt's tolerance for ADLs         With   [] TE   [] TA   [] neuro  [] manual   [] other: Patient Education: [x] Review HEP    [] Progressed/Changed HEP based on:   [] positioning   [] body mechanics   [] transfers   [] heat/ice application    [] other: Other Objective/Functional Measures:   []baseline resting tone:   []slow twitch mms   []fast twitch mms    Pain Level (0-10 scale) post treatment: 3/10    ASSESSMENT/Changes in Function: pt present with min improving tone of PFM. She cont to have fluctuating pain during sexual activities. Pain mostly along lower abdominal and during internal manual. Will progress hip and core strengthening therex and educated pt on MFR for abdominal region.      []  Decrease # of leaks [] No change []  Improving [] Resolved   []  Decrease hypertonus [] No change []  Improving [] Resolved   []  Increase void interval [] No change []  Improving [] Resolved   []  Increase PF strength [] No change []  Improving [] Resolved   []  Increase PF endurance [] No change []  Improving [] Resolved   []  Increase endurance [] No change []  Improving [] Resolved   []  Decrease # of pads [] No change []  Improving [] Resolved   []  Decrease pain [] No change []  Improving [] Resolved   []  Increased coordination [] No change []  Improving [] Resolved   []  Increased Bowel Frequency [] No change []  Improving [] Resolved      Patient will continue to benefit from skilled PT services to modify and progress therapeutic interventions, address functional mobility deficits, address ROM deficits, address strength deficits, analyze and address soft tissue restrictions, analyze and cue movement patterns, analyze and modify body mechanics/ergonomics, assess and modify postural abnormalities and instruct in home and community integration to attain remaining goals.      []  See Plan of Care  [x]  See progress note/recertification  []  See Discharge Summary      Progress towards goals / Updated goals:   Updated Goals: to be achieved in 5 weeks:  1. Patient will have FOTO score for PFDI Pain decreased by 5-8% points indicating improvement in function and report of no difficulty with maintaining intimate relations due to pain.   Status @ Eval: 38  Current: progressing well 29 10-29-18      2. Patient will be able to participate in sexual relations with no discomfort with 0-1/3 Marinoff score for increased quality of life and more normal function. Status @ Eval: 2-3/3   Current: fluctuating with pain during sexual relations, significant pain on Thursday last week when no pain with last time (Saturday) 10-22-18, 30% improvement overall 10-29-18      3. Patient will have decreased overall pain to 2-3/10 at most with ADLs, job duties and working out to improve her QOL. Status @ Eval: 8/10 at worst  Current: 6/10 10-29-18; fluctuating pain with sexual activities 11-12-18      4.  Patient will be independent with HEP at time of discharge to be able to continue with pelvic floor program.  Status @ Eval: initiate at 2nd visit  Current: good compliance 10-29-18      PLAN  [x]  Upgrade activities as tolerated     [x]  Continue plan of care  [x]  Update interventions per flow sheet       []  Discharge due to:_  []  Other:_      Luis Wu, PT 11/12/2018  8:16 AM    Future Appointments   Date Time Provider Laurent Ornelas   11/12/2018  2:00 PM Geneva Solomon [facility]   11/19/2018  3:15 PM [place] M MMCPTPB [facility]   11/26/2018  2:00 PM Geneva Solomon [facility]CPTPB [facility]

## 2018-11-19 ENCOUNTER — HOSPITAL ENCOUNTER (OUTPATIENT)
Dept: PHYSICAL THERAPY | Age: 33
Discharge: HOME | End: 2018-11-19
Attending: RADIOLOGY | Admitting: RADIOLOGY
Payer: COMMERCIAL

## 2018-11-19 PROCEDURE — 97140 MANUAL THERAPY 1/> REGIONS: CPT

## 2018-11-19 PROCEDURE — 97014 ELECTRIC STIMULATION THERAPY: CPT

## 2018-11-19 PROCEDURE — 97530 THERAPEUTIC ACTIVITIES: CPT

## 2018-11-19 NOTE — PROGRESS NOTES
PF DAILY TREATMENT NOTE 3-16    Patient Name: Nisha Patton  Date:2018  : 1985  [x]  Patient  Verified  Payor: BLUE CROSS / Plan: [facility] PPO / Product Type: PPO /    In time: 3:11  Out time: 4:00  Total Treatment Time (min): 49  Visit #: 8 of 16    Medicare/BCBS Only   Total Timed Codes (min):  39 1:1 Treatment Time:  49     Treatment Area: [x] Pelvic Floor     [] Other:    SUBJECTIVE  Pain Level (0-10 scale): 6-7/10  Any medication changes, allergies to medications, adverse drug reactions, diagnosis change, or new procedure performed?: [x] No    [] Yes (see summary sheet for update)  Subjective functional status/changes:   [] No changes reported  Pt reported more pain with abdominal region and groin pain. \"I have a hard week; people just trying to get everything organized before the holiday. \"    OBJECTIVE              Modality rationale: decrease pain and increase tissue extensibility to improve the patients ability to tolerate ADLs   Type Additional Details     [x] Estim:  []Unatt       [x]IFC 10 min  []Premod                    []Other:  []w/ice   [x]w/heat  Position: supine  Location: pelvic region     [] Estim: []Att    []TENS instruct  []NMES                    []Other:  []w/US   []w/ice   []w/heat  Position:  Location:     []  Ultrasound: []Continuous   [] Pulsed                           []1MHz   []3MHz Position:  Location:     []  Ice     []  heat  []  Ice massage  []  Laser   []  Anodyne Position:  Location:     [] Skin assessment post-treatment:  []intact []redness- no adverse reaction    []redness - adverse reaction:         8 min Therapeutic Activity:  []  See flow sheet :    []  Increase Tissue extensibility        []  Assess fiber intake    [x]  Assess voiding habits  []  Assess bowel habits  [x]  Other: review relaxation, hold PFM exercises/downtraining due to PFM tone   Rationale: increase ROM, increase strength, improve coordination, improve balance and increase proprioception  to improve the patients ability to improve leakage and urge for better QOL.     34 min Manual Therapy: intra-vaginal MFR   Rationale: decrease pain, increase ROM, increase tissue extensibility and decrease trigger points to improve pt's tolerance for ADLs                                                            With   [] TE   [] TA   [] neuro  [] manual   [] other: Patient Education: [x] Review HEP    [] Progressed/Changed HEP based on:   [] positioning   [] body mechanics   [] transfers   [] heat/ice application    [] other:      Other Objective/Functional Measures:   []baseline resting tone:   []slow twitch mms   []fast twitch mms    Max increased tone of PFM, pain with deep palpation along layer 2 and 3 PFM    Pain Level (0-10 scale) post treatment: 5/10      ASSESSMENT/Changes in Function: pt reports 2 leakage during last week; pt present with significant increase of tone and pain with internal manual. Pt reports stressful week.  Will initiate Yoga stretch and cont internal MFR next visit     []  Decrease # of leaks [] No change []  Improving [] Resolved   []  Decrease hypertonus [] No change []  Improving [] Resolved   []  Increase void interval [] No change []  Improving [] Resolved   []  Increase PF strength [] No change []  Improving [] Resolved   []  Increase PF endurance [] No change []  Improving [] Resolved   []  Increase endurance [] No change []  Improving [] Resolved   []  Decrease # of pads [] No change []  Improving [] Resolved   []  Decrease pain [] No change []  Improving [] Resolved   []  Increased coordination [] No change []  Improving [] Resolved   []  Increased Bowel Frequency [] No change []  Improving [] Resolved      Patient will continue to benefit from skilled PT services to modify and progress therapeutic interventions, address functional mobility deficits, address ROM deficits, address strength deficits, analyze and address soft tissue restrictions, analyze and cue movement patterns, analyze and modify body mechanics/ergonomics, assess and modify postural abnormalities and instruct in home and community integration to attain remaining goals.      []  See Plan of Care  [x]  See progress note/recertification  []  See Discharge Summary      Progress towards goals / Updated goals:   Updated Goals: to be achieved in 5 weeks:  1. Patient will have FOTO score for PFDI Pain decreased by 5-8% points indicating improvement in function and report of no difficulty with maintaining intimate relations due to pain. Status @ Eval: 38  Current: progressing well 29 10-29-18      2. Patient will be able to participate in sexual relations with no discomfort with 0-1/3 Marinoff score for increased quality of life and more normal function. Status @ Eval: 2-3/3   Current: fluctuating with pain during sexual relations, significant pain on Thursday last week when no pain with last time (Saturday) 10-22-18, 30% improvement overall 10-29-18      3. Patient will have decreased overall pain to 2-3/10 at most with ADLs, job duties and working out to improve her QOL. Status @ Eval: 8/10 at worst  Current: 6/10 10-29-18; fluctuating pain with sexual activities 11-12-18; fluctuating 11-19-18      4.  Patient will be independent with HEP at time of discharge to be able to continue with pelvic floor program.  Status @ Eval: initiate at 2nd visit  Current: good compliance 10-29-18      PLAN  [x]  Upgrade activities as tolerated     [x]  Continue plan of care  [x]  Update interventions per flow sheet       []  Discharge due to:_  []  Other:_      Celester Reusing, PT 11/19/2018  12:28 PM    Future Appointments   Date Time Provider Laurent Ornelas   11/19/2018  3:15 PM Azael Null DDUDDNG [facility]   11/26/2018  2:00 PM Azael Null MMCPTPB [facility]

## 2018-11-26 ENCOUNTER — APPOINTMENT (OUTPATIENT)
Dept: PHYSICAL THERAPY | Age: 33
End: 2018-11-26
Attending: RADIOLOGY
Payer: COMMERCIAL

## 2018-12-10 ENCOUNTER — APPOINTMENT (OUTPATIENT)
Dept: PHYSICAL THERAPY | Age: 33
End: 2018-12-10

## 2018-12-17 ENCOUNTER — HOSPITAL ENCOUNTER (OUTPATIENT)
Dept: PHYSICAL THERAPY | Age: 33
End: 2018-12-17

## 2018-12-17 NOTE — PROGRESS NOTES
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 Patient will have FOTO score for PFDI Pain decreased by 5-8% points indicating improvement in function and report of no difficulty with maintaining intimate relations due to pain. Status @ Eval: 38  Current: progressing well 29 10-29-18  Status at discharge: not met      2. Patient will be able to participate in sexual relations with no discomfort with 0-1/3 Marinoff score for increased quality of life and more normal function. Status @ Eval: 2-3/3   Current: fluctuating with pain during sexual relations, significant pain on Thursday last week when no pain with last time (Saturday) 10-22-18, 30% improvement overall 10-29-18  Status at discharge: not met      3. Patient will have decreased overall pain to 2-3/10 at most with ADLs, job duties and working out to improve her QOL. Status @ Eval: 8/10 at worst  Current: /10 10-; fluctuating pain with sexual activities 18; fluctuating 18  Status at discharge: not met      4.  Patient will be independent with HEP at time of discharge to be able to continue with pelvic floor program.  Status @ Eval: initiate at 2nd visit  Current: good compliance 10-29-18   Status at discharge: met      ASSESSMENT/RECOMMENDATIONS: pt self discharged with no reason stated.     [x]Discontinue therapy: []Patient has reached or is progressing toward set goals      [x]Patient is non-compliant or has abdicated      []Due to lack of appreciable progress towards set goals    Johana Caballero, PT 12/17/2018 1:07 PM

## 2018-12-31 ENCOUNTER — APPOINTMENT (OUTPATIENT)
Dept: PHYSICAL THERAPY | Age: 33
End: 2018-12-31

## 2020-08-20 ENCOUNTER — HOSPITAL ENCOUNTER (OUTPATIENT)
Dept: LAB | Age: 35
Discharge: HOME | End: 2020-08-20
Payer: COMMERCIAL

## 2020-08-20 LAB
BASOPHILS # BLD: 0 K/UL (ref 0–0.1)
BASOPHILS NFR BLD: 0 % (ref 0–2)
DIFFERENTIAL METHOD BLD: ABNORMAL
EOSINOPHIL # BLD: 0.1 K/UL (ref 0–0.4)
EOSINOPHIL NFR BLD: 1 % (ref 0–5)
ERYTHROCYTE [DISTWIDTH] IN BLOOD BY AUTOMATED COUNT: 14.3 % (ref 11.6–14.5)
HCT VFR BLD AUTO: 30.9 % (ref 35–45)
HGB BLD-MCNC: 10.3 G/DL (ref 12–16)
LYMPHOCYTES # BLD: 2.6 K/UL (ref 0.9–3.6)
LYMPHOCYTES NFR BLD: 20 % (ref 21–52)
MCH RBC QN AUTO: 26.3 PG (ref 24–34)
MCHC RBC AUTO-ENTMCNC: 33.3 G/DL (ref 31–37)
MCV RBC AUTO: 79 FL (ref 74–97)
MONOCYTES # BLD: 0.8 K/UL (ref 0.05–1.2)
MONOCYTES NFR BLD: 6 % (ref 3–10)
NEUTS SEG # BLD: 9.8 K/UL (ref 1.8–8)
NEUTS SEG NFR BLD: 73 % (ref 40–73)
PLATELET # BLD AUTO: 402 K/UL (ref 135–420)
PMV BLD AUTO: 10.7 FL (ref 9.2–11.8)
RBC # BLD AUTO: 3.91 M/UL (ref 4.2–5.3)
WBC # BLD AUTO: 13.3 K/UL (ref 4.6–13.2)

## 2020-08-20 PROCEDURE — 85025 COMPLETE CBC W/AUTO DIFF WBC: CPT

## 2020-08-20 PROCEDURE — 36415 COLL VENOUS BLD VENIPUNCTURE: CPT
